# Patient Record
Sex: FEMALE | Race: ASIAN | NOT HISPANIC OR LATINO | URBAN - METROPOLITAN AREA
[De-identification: names, ages, dates, MRNs, and addresses within clinical notes are randomized per-mention and may not be internally consistent; named-entity substitution may affect disease eponyms.]

---

## 2022-06-13 ENCOUNTER — EMERGENCY (EMERGENCY)
Facility: HOSPITAL | Age: 47
LOS: 1 days | Discharge: HOME | End: 2022-06-13
Attending: EMERGENCY MEDICINE
Payer: COMMERCIAL

## 2022-06-13 VITALS
DIASTOLIC BLOOD PRESSURE: 72 MMHG | SYSTOLIC BLOOD PRESSURE: 124 MMHG | WEIGHT: 141.98 LBS | RESPIRATION RATE: 18 BRPM | OXYGEN SATURATION: 99 % | TEMPERATURE: 99 F | HEART RATE: 83 BPM | HEIGHT: 64 IN

## 2022-06-13 PROCEDURE — 99283 EMERGENCY DEPT VISIT LOW MDM: CPT

## 2022-06-13 NOTE — ED PROVIDER NOTE - PATIENT PORTAL LINK FT
You can access the FollowMyHealth Patient Portal offered by [facility] by registering at the following website: [email or web address]. By joining [employer]’s FollowMyHealth portal, you will also be able to view your health information using other applications (apps) compatible with our system.

## 2022-06-13 NOTE — ED PROVIDER NOTE - CONSTITUTIONAL, MLM
normal... Well appearing, awake, alert, oriented to person, place, time/situation and in moderate distress

## 2022-06-13 NOTE — ED PROVIDER NOTE - NSFOLLOWUPINSTRUCTIONS_ED_ALL_ED_FT
[facility]                                                                                                          Shingles    A rash on the skin.   Shingles, which is also known as herpes zoster, is an infection that causes a painful skin rash and fluid-filled blisters. It is caused by a virus.    Shingles only develops in people who:  •Have had chickenpox.      •Have been vaccinated against chickenpox. Shingles is rare in this group.        What are the causes?    Shingles is caused by varicella-zoster virus. This is the same virus that causes chickenpox. After a person is exposed to the virus, it stays in the body in an inactive (dormant) state. Shingles develops if the virus is reactivated. This can happen many years after the first (initial) exposure to the virus. It is not known what causes this virus to be reactivated.      What increases the risk?    People who have had chickenpox or received the chickenpox vaccine are at risk for shingles. Shingles infection is more common in people who:  •Are older than 60 years of age.    •Have a weakened disease-fighting system (immune system), such as people with:  •HIV (human immunodeficiency virus).      •AIDS (acquired immunodeficiency syndrome).      •Cancer.        •Are taking medicines that weaken the immune system, such as organ transplant medicines.      •Are experiencing a lot of stress.        What are the signs or symptoms?    Early symptoms of this condition include itching, tingling, and pain in an area on your skin. Pain may be described as burning, stabbing, or throbbing.    A few days or weeks after early symptoms start, a painful red rash appears. The rash is usually on one side of the body and has a band-like or belt-like pattern. The rash eventually turns into fluid-filled blisters that break open, change into scabs, and dry up in about 2–3 weeks.    At any time during the infection, you may also develop:  •A fever.      •Chills.      •A headache.      •Nausea.        How is this diagnosed?    This condition is diagnosed with a skin exam. Skin or fluid samples (a culture) may be taken from the blisters before a diagnosis is made.      How is this treated?    The rash may last for several weeks. There is not a specific cure for this condition. Your health care provider may prescribe medicines to help you manage pain, recover more quickly, and avoid long-term problems. Medicines may include:  •Antiviral medicines.      •Anti-inflammatory medicines.      •Pain medicines.      •Anti-itching medicines (antihistamines).      If the area involved is on your face, you may be referred to a specialist, such as an eye doctor (ophthalmologist) or an ear, nose, and throat (ENT) doctor (otorhinolaryngologist) to help you avoid eye problems, chronic pain, or disability.      Follow these instructions at home:    Medicines     •Take over-the-counter and prescription medicines only as told by your health care provider.      •Apply an anti-itch cream or numbing cream to the affected area as told by your health care provider.        Relieving itching and discomfort   A bathtub filled with water.   •Apply cold, wet cloths (cold compresses) to the area of the rash or blisters as told by your health care provider.      •Cool baths can be soothing. Try adding baking soda or dry oatmeal to the water to reduce itching. Do not bathe in hot water.      •Use calamine lotion as recommended by your health care provider. This is an over-the-counter lotion that helps to relieve itchiness.      Blister and rash care     •Keep your rash covered with a loose bandage (dressing). Wear loose-fitting clothing to help ease the pain of material rubbing against the rash.      •Wash your hands with soap and water for at least 20 seconds before and after you change your dressing. If soap and water are not available, use hand .      •Change your dressing as told by your health care provider.      •Keep your rash and blisters clean by washing the area with mild soap and cool water as told by your health care provider.    •Check your rash every day for signs of infection. Check for:  •More redness, swelling, or pain.      •Fluid or blood.      •Warmth.      •Pus or a bad smell.      • Do not scratch your rash or pick at your blisters. To help avoid scratching:  •Keep your fingernails clean and cut short.      •Wear gloves or mittens while you sleep, if scratching is a problem.        General instructions     •Rest as told by your health care provider.      •Wash your hands often with soap and water for at least 20 seconds. If soap and water are not available, use hand . Doing this lowers your chance of getting a bacterial skin infection.    •Before your blisters change into scabs, your shingles infection can cause chickenpox in people who have never had it or have never been vaccinated against it. To prevent this from happening, avoid contact with other people, especially:  •Babies.      •Pregnant women.      •Children who have eczema.      •Older people who have transplants.      •People who have chronic illnesses, such as cancer or AIDS.        •Keep all follow-up visits. This is important.        How is this prevented?    Getting vaccinated is the best way to prevent shingles and protect against shingles complications. If you have not been vaccinated, talk with your health care provider about getting the vaccine.      Where to find more information    •Centers for Disease Control and Prevention: www.cdc.gov        Contact a health care provider if:    •Your pain is not relieved with prescribed medicines.      •Your pain does not get better after the rash heals.    •You have any of these signs of infection:  •More redness, swelling, or pain around the rash.      •Fluid or blood coming from the rash.      •Warmth coming from your rash.      •Pus or a bad smell coming from the rash.      •A fever.          Get help right away if:    •The rash is on your face or nose.      •You have facial pain, pain around your eye area, or loss of feeling on one side of your face.      •You have difficulty seeing.      •You have ear pain or have ringing in your ear.      •You have a loss of taste.      •Your condition gets worse.        Summary    •Shingles, also known as herpes zoster, is an infection that causes a painful skin rash and fluid-filled blisters.      •This condition is diagnosed with a skin exam. Skin or fluid samples (a culture) may be taken from the blisters.      •Keep your rash covered with a loose bandage (dressing). Wear loose-fitting clothing to help ease the pain of material rubbing against the rash.      •Before your blisters change into scabs, your shingles infection can cause chickenpox in people who have never had it or have never been vaccinated against it.      This information is not intended to replace advice given to you by your health care provider. Make sure you discuss any questions you have with your health care provider.      Document Revised: 12/13/2021 Document Reviewed: 12/13/2021    Elsevier Patient Education © 2022 [employer]

## 2022-06-13 NOTE — ED PROVIDER NOTE - ENMT, MLM
Airway patent, Nasal mucosa clear. Mouth with normal mucosa. Throat has no vesicles, no oropharyngeal exudates and uvula is midline.  shingles involving outer ear and mental area

## 2022-09-01 PROBLEM — Z00.00 ENCOUNTER FOR PREVENTIVE HEALTH EXAMINATION: Status: ACTIVE | Noted: 2022-09-01

## 2022-09-06 ENCOUNTER — APPOINTMENT (OUTPATIENT)
Dept: NEUROLOGY | Facility: CLINIC | Age: 47
End: 2022-09-06

## 2022-09-23 ENCOUNTER — APPOINTMENT (OUTPATIENT)
Dept: OPHTHALMOLOGY | Facility: CLINIC | Age: 47
End: 2022-09-23

## 2022-09-23 ENCOUNTER — NON-APPOINTMENT (OUTPATIENT)
Age: 47
End: 2022-09-23

## 2022-09-23 PROBLEM — Z78.9 OTHER SPECIFIED HEALTH STATUS: Chronic | Status: ACTIVE | Noted: 2022-06-13

## 2022-09-23 PROCEDURE — 92004 COMPRE OPH EXAM NEW PT 1/>: CPT

## 2022-10-03 ENCOUNTER — APPOINTMENT (OUTPATIENT)
Dept: OPHTHALMOLOGY | Facility: CLINIC | Age: 47
End: 2022-10-03

## 2022-10-03 ENCOUNTER — NON-APPOINTMENT (OUTPATIENT)
Age: 47
End: 2022-10-03

## 2022-10-03 PROCEDURE — 92012 INTRM OPH EXAM EST PATIENT: CPT

## 2022-10-14 ENCOUNTER — NON-APPOINTMENT (OUTPATIENT)
Age: 47
End: 2022-10-14

## 2022-10-14 ENCOUNTER — APPOINTMENT (OUTPATIENT)
Dept: OPHTHALMOLOGY | Facility: CLINIC | Age: 47
End: 2022-10-14

## 2022-10-14 PROCEDURE — 92012 INTRM OPH EXAM EST PATIENT: CPT

## 2022-11-17 ENCOUNTER — APPOINTMENT (OUTPATIENT)
Dept: OPHTHALMOLOGY | Facility: CLINIC | Age: 47
End: 2022-11-17

## 2022-11-17 ENCOUNTER — NON-APPOINTMENT (OUTPATIENT)
Age: 47
End: 2022-11-17

## 2022-11-17 PROCEDURE — 92012 INTRM OPH EXAM EST PATIENT: CPT

## 2022-11-20 ENCOUNTER — TRANSCRIPTION ENCOUNTER (OUTPATIENT)
Age: 47
End: 2022-11-20

## 2022-11-20 ENCOUNTER — NON-APPOINTMENT (OUTPATIENT)
Age: 47
End: 2022-11-20

## 2022-12-02 ENCOUNTER — NON-APPOINTMENT (OUTPATIENT)
Age: 47
End: 2022-12-02

## 2022-12-02 ENCOUNTER — APPOINTMENT (OUTPATIENT)
Dept: OPHTHALMOLOGY | Facility: CLINIC | Age: 47
End: 2022-12-02

## 2022-12-02 PROCEDURE — 92012 INTRM OPH EXAM EST PATIENT: CPT

## 2023-01-19 ENCOUNTER — APPOINTMENT (OUTPATIENT)
Dept: OPHTHALMOLOGY | Facility: CLINIC | Age: 48
End: 2023-01-19

## 2023-01-27 ENCOUNTER — APPOINTMENT (OUTPATIENT)
Dept: OPHTHALMOLOGY | Facility: CLINIC | Age: 48
End: 2023-01-27
Payer: COMMERCIAL

## 2023-01-27 ENCOUNTER — NON-APPOINTMENT (OUTPATIENT)
Age: 48
End: 2023-01-27

## 2023-01-27 PROCEDURE — 92012 INTRM OPH EXAM EST PATIENT: CPT

## 2023-02-03 ENCOUNTER — APPOINTMENT (OUTPATIENT)
Dept: OPHTHALMOLOGY | Facility: CLINIC | Age: 48
End: 2023-02-03
Payer: COMMERCIAL

## 2023-02-03 ENCOUNTER — NON-APPOINTMENT (OUTPATIENT)
Age: 48
End: 2023-02-03

## 2023-02-03 PROCEDURE — 92012 INTRM OPH EXAM EST PATIENT: CPT

## 2023-03-16 ENCOUNTER — NON-APPOINTMENT (OUTPATIENT)
Age: 48
End: 2023-03-16

## 2023-03-16 ENCOUNTER — APPOINTMENT (OUTPATIENT)
Dept: OPHTHALMOLOGY | Facility: CLINIC | Age: 48
End: 2023-03-16
Payer: COMMERCIAL

## 2023-03-16 PROCEDURE — 92012 INTRM OPH EXAM EST PATIENT: CPT

## 2023-03-24 ENCOUNTER — APPOINTMENT (OUTPATIENT)
Dept: OPHTHALMOLOGY | Facility: CLINIC | Age: 48
End: 2023-03-24
Payer: COMMERCIAL

## 2023-03-24 ENCOUNTER — NON-APPOINTMENT (OUTPATIENT)
Age: 48
End: 2023-03-24

## 2023-03-24 PROCEDURE — 92012 INTRM OPH EXAM EST PATIENT: CPT

## 2023-04-28 ENCOUNTER — NON-APPOINTMENT (OUTPATIENT)
Age: 48
End: 2023-04-28

## 2023-04-28 ENCOUNTER — APPOINTMENT (OUTPATIENT)
Dept: OPHTHALMOLOGY | Facility: CLINIC | Age: 48
End: 2023-04-28
Payer: COMMERCIAL

## 2023-04-28 PROCEDURE — 92012 INTRM OPH EXAM EST PATIENT: CPT

## 2023-04-28 PROCEDURE — 92133 CPTRZD OPH DX IMG PST SGM ON: CPT

## 2023-08-21 ENCOUNTER — NON-APPOINTMENT (OUTPATIENT)
Age: 48
End: 2023-08-21

## 2023-08-21 ENCOUNTER — APPOINTMENT (OUTPATIENT)
Dept: OPHTHALMOLOGY | Facility: CLINIC | Age: 48
End: 2023-08-21
Payer: COMMERCIAL

## 2023-08-21 PROCEDURE — 92012 INTRM OPH EXAM EST PATIENT: CPT

## 2023-12-11 ENCOUNTER — APPOINTMENT (OUTPATIENT)
Dept: OPHTHALMOLOGY | Facility: CLINIC | Age: 48
End: 2023-12-11
Payer: COMMERCIAL

## 2023-12-11 ENCOUNTER — NON-APPOINTMENT (OUTPATIENT)
Age: 48
End: 2023-12-11

## 2023-12-11 PROCEDURE — 92012 INTRM OPH EXAM EST PATIENT: CPT

## 2024-03-04 ENCOUNTER — NON-APPOINTMENT (OUTPATIENT)
Age: 49
End: 2024-03-04

## 2024-03-07 ENCOUNTER — APPOINTMENT (OUTPATIENT)
Dept: OPHTHALMOLOGY | Facility: CLINIC | Age: 49
End: 2024-03-07
Payer: COMMERCIAL

## 2024-03-07 ENCOUNTER — NON-APPOINTMENT (OUTPATIENT)
Age: 49
End: 2024-03-07

## 2024-03-07 PROCEDURE — 92012 INTRM OPH EXAM EST PATIENT: CPT

## 2024-03-22 ENCOUNTER — NON-APPOINTMENT (OUTPATIENT)
Age: 49
End: 2024-03-22

## 2024-03-22 ENCOUNTER — APPOINTMENT (OUTPATIENT)
Dept: OPHTHALMOLOGY | Facility: CLINIC | Age: 49
End: 2024-03-22
Payer: COMMERCIAL

## 2024-03-22 PROCEDURE — 92012 INTRM OPH EXAM EST PATIENT: CPT

## 2024-04-08 NOTE — ED PROVIDER NOTE - WET READ LAUNCH FT
There are no Wet Read(s) to document. Spine appears normal, range of motion is not limited, no muscle or joint tenderness

## 2024-04-15 ENCOUNTER — APPOINTMENT (OUTPATIENT)
Dept: OPHTHALMOLOGY | Facility: CLINIC | Age: 49
End: 2024-04-15

## 2024-05-16 ENCOUNTER — NON-APPOINTMENT (OUTPATIENT)
Age: 49
End: 2024-05-16

## 2024-05-16 ENCOUNTER — APPOINTMENT (OUTPATIENT)
Dept: OPHTHALMOLOGY | Facility: CLINIC | Age: 49
End: 2024-05-16
Payer: COMMERCIAL

## 2024-05-16 PROCEDURE — 92012 INTRM OPH EXAM EST PATIENT: CPT

## 2024-07-19 ENCOUNTER — NON-APPOINTMENT (OUTPATIENT)
Age: 49
End: 2024-07-19

## 2024-07-19 ENCOUNTER — APPOINTMENT (OUTPATIENT)
Dept: OPHTHALMOLOGY | Facility: CLINIC | Age: 49
End: 2024-07-19
Payer: COMMERCIAL

## 2024-07-19 PROCEDURE — 92012 INTRM OPH EXAM EST PATIENT: CPT

## 2024-11-18 ENCOUNTER — NON-APPOINTMENT (OUTPATIENT)
Age: 49
End: 2024-11-18

## 2024-11-18 ENCOUNTER — APPOINTMENT (OUTPATIENT)
Dept: OPHTHALMOLOGY | Facility: CLINIC | Age: 49
End: 2024-11-18
Payer: COMMERCIAL

## 2024-11-18 PROCEDURE — 92012 INTRM OPH EXAM EST PATIENT: CPT

## 2025-05-19 ENCOUNTER — APPOINTMENT (OUTPATIENT)
Dept: OPHTHALMOLOGY | Facility: CLINIC | Age: 50
End: 2025-05-19
Payer: COMMERCIAL

## 2025-05-19 ENCOUNTER — NON-APPOINTMENT (OUTPATIENT)
Age: 50
End: 2025-05-19

## 2025-05-19 PROCEDURE — 92250 FUNDUS PHOTOGRAPHY W/I&R: CPT

## 2025-05-19 PROCEDURE — 92014 COMPRE OPH EXAM EST PT 1/>: CPT
